# Patient Record
Sex: MALE | Race: WHITE | HISPANIC OR LATINO | Employment: FULL TIME | URBAN - METROPOLITAN AREA
[De-identification: names, ages, dates, MRNs, and addresses within clinical notes are randomized per-mention and may not be internally consistent; named-entity substitution may affect disease eponyms.]

---

## 2018-08-17 ENCOUNTER — HOSPITAL ENCOUNTER (OUTPATIENT)
Facility: HOSPITAL | Age: 33
Discharge: HOME | End: 2018-08-18
Attending: EMERGENCY MEDICINE | Admitting: OTOLARYNGOLOGY

## 2018-08-17 DIAGNOSIS — R09.A2 FOREIGN BODY SENSATION IN THROAT: Primary | ICD-10-CM

## 2018-08-17 PROCEDURE — 99284 EMERGENCY DEPT VISIT MOD MDM: CPT | Mod: ,,, | Performed by: EMERGENCY MEDICINE

## 2018-08-17 PROCEDURE — 99284 EMERGENCY DEPT VISIT MOD MDM: CPT | Mod: 25

## 2018-08-18 VITALS
RESPIRATION RATE: 14 BRPM | HEART RATE: 53 BPM | SYSTOLIC BLOOD PRESSURE: 118 MMHG | BODY MASS INDEX: 29.71 KG/M2 | OXYGEN SATURATION: 99 % | DIASTOLIC BLOOD PRESSURE: 90 MMHG | HEIGHT: 64 IN | TEMPERATURE: 98 F | WEIGHT: 174 LBS

## 2018-08-18 PROBLEM — R09.A2 FOREIGN BODY SENSATION IN THROAT: Status: ACTIVE | Noted: 2018-08-18

## 2018-08-18 PROCEDURE — G0378 HOSPITAL OBSERVATION PER HR: HCPCS

## 2018-08-18 PROCEDURE — 63600175 PHARM REV CODE 636 W HCPCS: Performed by: OTOLARYNGOLOGY

## 2018-08-18 PROCEDURE — 99204 OFFICE O/P NEW MOD 45 MIN: CPT | Mod: 25,,, | Performed by: OTOLARYNGOLOGY

## 2018-08-18 PROCEDURE — 31575 DIAGNOSTIC LARYNGOSCOPY: CPT | Mod: ,,, | Performed by: OTOLARYNGOLOGY

## 2018-08-18 RX ORDER — ACETAMINOPHEN 325 MG/1
650 TABLET ORAL EVERY 8 HOURS PRN
Status: DISCONTINUED | OUTPATIENT
Start: 2018-08-18 | End: 2018-08-18 | Stop reason: HOSPADM

## 2018-08-18 RX ORDER — METHYLPREDNISOLONE 4 MG/1
TABLET ORAL
Qty: 1 PACKAGE | Refills: 0 | Status: SHIPPED | OUTPATIENT
Start: 2018-08-18 | End: 2018-09-08

## 2018-08-18 RX ORDER — IBUPROFEN 400 MG/1
400 TABLET ORAL EVERY 6 HOURS PRN
Status: DISCONTINUED | OUTPATIENT
Start: 2018-08-18 | End: 2018-08-18 | Stop reason: HOSPADM

## 2018-08-18 RX ORDER — ONDANSETRON 8 MG/1
8 TABLET, ORALLY DISINTEGRATING ORAL EVERY 8 HOURS PRN
Status: DISCONTINUED | OUTPATIENT
Start: 2018-08-18 | End: 2018-08-18 | Stop reason: HOSPADM

## 2018-08-18 RX ORDER — DEXAMETHASONE SODIUM PHOSPHATE 4 MG/ML
8 INJECTION, SOLUTION INTRA-ARTICULAR; INTRALESIONAL; INTRAMUSCULAR; INTRAVENOUS; SOFT TISSUE EVERY 6 HOURS
Status: DISCONTINUED | OUTPATIENT
Start: 2018-08-18 | End: 2018-08-18 | Stop reason: HOSPADM

## 2018-08-18 RX ORDER — DEXAMETHASONE SODIUM PHOSPHATE 100 MG/10ML
8 INJECTION INTRAMUSCULAR; INTRAVENOUS EVERY 6 HOURS
Status: DISCONTINUED | OUTPATIENT
Start: 2018-08-18 | End: 2018-08-18 | Stop reason: RX

## 2018-08-18 RX ORDER — DIPHENHYDRAMINE HYDROCHLORIDE 50 MG/ML
25 INJECTION INTRAMUSCULAR; INTRAVENOUS EVERY 4 HOURS PRN
Status: DISCONTINUED | OUTPATIENT
Start: 2018-08-18 | End: 2018-08-18 | Stop reason: HOSPADM

## 2018-08-18 RX ORDER — RAMELTEON 8 MG/1
8 TABLET ORAL NIGHTLY PRN
Status: DISCONTINUED | OUTPATIENT
Start: 2018-08-18 | End: 2018-08-18 | Stop reason: HOSPADM

## 2018-08-18 RX ADMIN — DEXAMETHASONE SODIUM PHOSPHATE 8 MG: 10 INJECTION, SOLUTION INTRAMUSCULAR; INTRAVENOUS at 12:08

## 2018-08-18 NOTE — SUBJECTIVE & OBJECTIVE
Medications:  Continuous Infusions:  Scheduled Meds:   dexamethasone  8 mg Intravenous Q6H     PRN Meds:acetaminophen, diphenhydrAMINE, ibuprofen, ondansetron, promethazine (PHENERGAN) IVPB, ramelteon     No current facility-administered medications on file prior to encounter.      No current outpatient medications on file prior to encounter.       Review of patient's allergies indicates:  No Known Allergies    History reviewed. No pertinent past medical history.  No past surgical history on file.  Family History     None        Tobacco Use    Smoking status: Not on file   Substance and Sexual Activity    Alcohol use: Not on file    Drug use: Not on file    Sexual activity: Not on file     Review of Systems  Objective:     Vital Signs (Most Recent):  Temp: 97.5 °F (36.4 °C) (08/18/18 0416)  Pulse: (!) 52 (08/18/18 0416)  Resp: 18 (08/18/18 0416)  BP: 121/73 (08/18/18 0416)  SpO2: 99 % (08/18/18 0416) Vital Signs (24h Range):  Temp:  [97.5 °F (36.4 °C)-97.7 °F (36.5 °C)] 97.5 °F (36.4 °C)  Pulse:  [52-68] 52  Resp:  [18-20] 18  SpO2:  [99 %] 99 %  BP: (121-135)/(73-82) 121/73     Weight: 78.9 kg (174 lb)  Body mass index is 29.87 kg/m².         Physical Exam    Significant Labs:  {Results:41877}    Significant Diagnostics:  {Imaging Review:73310}

## 2018-08-18 NOTE — SUBJECTIVE & OBJECTIVE
Medications:  Continuous Infusions:  Scheduled Meds:   dexamethasone  8 mg Intravenous Q6H     PRN Meds:acetaminophen, diphenhydrAMINE, ibuprofen, ondansetron, promethazine (PHENERGAN) IVPB, ramelteon     No current facility-administered medications on file prior to encounter.      No current outpatient medications on file prior to encounter.       Review of patient's allergies indicates:  No Known Allergies    History reviewed. No pertinent past medical history.  No past surgical history on file.  Family History     None        Tobacco Use    Smoking status: Not on file   Substance and Sexual Activity    Alcohol use: Not on file    Drug use: Not on file    Sexual activity: Not on file     Review of Systems   Constitutional: Negative for chills, diaphoresis and fever.   HENT: Negative for facial swelling, sinus pressure, trouble swallowing and voice change.    Eyes: Negative for pain and visual disturbance.   Respiratory: Negative for choking, shortness of breath and stridor.      Objective:     Vital Signs (Most Recent):  Temp: 97.7 °F (36.5 °C) (08/17/18 2344)  Pulse: 68 (08/17/18 2344)  Resp: 20 (08/17/18 2344)  BP: 135/82 (08/17/18 2344)  SpO2: 99 % (08/17/18 2344) Vital Signs (24h Range):  Temp:  [97.7 °F (36.5 °C)] 97.7 °F (36.5 °C)  Pulse:  [68] 68  Resp:  [20] 20  SpO2:  [99 %] 99 %  BP: (135)/(82) 135/82     Weight: 78.9 kg (174 lb)  Body mass index is 29.87 kg/m².         Physical Exam  NAD  Awake and alert  Head AT/NC  Auricles WNL AU  Nose w/ normal external appearance  MMM, anterior tongue mobile, FOM soft, OP patent w/ midline uvula, tonsils 2+ bilateral and symmetric  Neck soft, not TTP, normal ROM, no LAD  Normal WOB, no stridor or stertor    Flexible Fiberoptic Laryngoscopy   Nasal cavity/nasopharynx: normal mucosa, inferior turbinates, and septum. No evidence of septal deviation or perforation. There are no visible lesions. Bilateral cesar WNL  Oropharynx: pharyngeal wall without edema,  lesions, or masses. Bilateral pharyngeal tonsils WNL. BOT symmetric without masses or lingual tonsillar hypertrophy.   Hypopharynx: There are no lesions of the piriform sinuses or postcricoid area. There is not pooling of secretions.  Supraglottis: there is no edema of the arytenoids, interarytenoid space, or epiglottis. Epiglottis is crisp. There are no masses noted. False vocal folds without lesions.  Glottis: TVF without lesions and with normal mobility and airway patency  Subglottis: no stenosis or masses          Significant Labs:  None    Significant Diagnostics:  X-Ray: I have reviewed all pertinent results/findings within the past 24 hours and my personal findings are:  plain neck films w/ no abnormalities

## 2018-08-18 NOTE — H&P
[facility]  Otorhinolaryngology-Head & Neck Surgery  H&P Note    Patient Name: Doe Moe  MRN: [id number]  Code Status: Full Code  Admission Date: 8/17/2018  Hospital Length of Stay: 0 days  Attending Physician: Brandon Kitchen MD  Primary Care Provider: Primary Doctor No    Patient information was obtained from patient.     Inpatient consult to ENT  Consult performed by: Angel Sherman MD  Consult ordered by: Livia Ortega MD        Subjective:     Chief Complaint/Reason for Admission: foreign body    History of Present Illness: 34 y/o M no PMH presents to ED after swallowing what he believes to be a fish bone 1 hour ago. He continues to have a globus sensation and pain deep in his throat on the left. It has remained stable and his symptoms have not progressed. He does not have difficulty swallowing but does c/o slight odynophagia. He denies any SOB, stridor, or other symptoms at this time.    Medications:  Continuous Infusions:  Scheduled Meds:   dexamethasone  8 mg Intravenous Q6H     PRN Meds:acetaminophen, diphenhydrAMINE, ibuprofen, ondansetron, promethazine (PHENERGAN) IVPB, ramelteon     No current facility-administered medications on file prior to encounter.      No current outpatient medications on file prior to encounter.       Review of patient's allergies indicates:  No Known Allergies    History reviewed. No pertinent past medical history.  No past surgical history on file.  Family History     None        Tobacco Use    Smoking status: Not on file   Substance and Sexual Activity    Alcohol use: Not on file    Drug use: Not on file    Sexual activity: Not on file     Review of Systems   Constitutional: Negative for chills, diaphoresis and fever.   HENT: Negative for facial swelling, sinus pressure, trouble swallowing and voice change.    Eyes: Negative for pain and visual disturbance.   Respiratory: Negative for choking, shortness of breath and stridor.      Objective:      Vital Signs (Most Recent):  Temp: 97.7 °F (36.5 °C) (08/17/18 2344)  Pulse: 68 (08/17/18 2344)  Resp: 20 (08/17/18 2344)  BP: 135/82 (08/17/18 2344)  SpO2: 99 % (08/17/18 2344) Vital Signs (24h Range):  Temp:  [97.7 °F (36.5 °C)] 97.7 °F (36.5 °C)  Pulse:  [68] 68  Resp:  [20] 20  SpO2:  [99 %] 99 %  BP: (135)/(82) 135/82     Weight: 78.9 kg (174 lb)  Body mass index is 29.87 kg/m².         Physical Exam  NAD  Awake and alert  Head AT/NC  Auricles WNL AU  Nose w/ normal external appearance  MMM, anterior tongue mobile, FOM soft, OP patent w/ midline uvula, tonsils 2+ bilateral and symmetric  Neck soft, not TTP, normal ROM, no LAD  Normal WOB, no stridor or stertor    Flexible Fiberoptic Laryngoscopy   Nasal cavity/nasopharynx: normal mucosa, inferior turbinates, and septum. No evidence of septal deviation or perforation. There are no visible lesions. Bilateral cesar WNL  Oropharynx: pharyngeal wall without edema, lesions, or masses. Bilateral pharyngeal tonsils WNL. BOT symmetric without masses or lingual tonsillar hypertrophy.   Hypopharynx: There are no lesions of the piriform sinuses or postcricoid area. There is not pooling of secretions.  Supraglottis: there is no edema of the arytenoids, interarytenoid space, or epiglottis. Epiglottis is crisp. There are no masses noted. False vocal folds without lesions.  Glottis: TVF without lesions and with normal mobility and airway patency  Subglottis: no stenosis or masses          Significant Labs:  None    Significant Diagnostics:  X-Ray: I have reviewed all pertinent results/findings within the past 24 hours and my personal findings are:  plain neck films w/ no abnormalities    Assessment/Plan:     Foreign body sensation in throat    34 y/o M w/ presumed foreign body in UADT. FFL reveals no abnormalities or foreign body though patient still has symptoms. He is AFVSS and in NAD w/ no concerning airway signs.    - admit for observation  - decadron  - NPO  - plan for  repeat FFL in the morning  - anticipate d/c home later this afternoon          VTE Risk Mitigation (From admission, onward)        Ordered     Place sequential compression device  Until discontinued      08/18/18 0153     IP VTE LOW RISK PATIENT  Once      08/18/18 0153          Thank you for your consult. I will follow-up with patient. Please contact us if you have any additional questions.    Angel Sherman MD  Otorhinolaryngology-Head & Neck Surgery  [facility]

## 2018-08-18 NOTE — ED PROVIDER NOTES
Encounter Date: 8/17/2018       History     Chief Complaint   Patient presents with    Foreign Body In Throat     fishbone in throat for 25 mintute w/ pain and trouble swallowing. 8/10 . pt denies chest pain or sob,     33yoM w/no PMH prsenting with sensation of foreign body in the back of his throat after swallowing a fish bone 25 minutes PTA. Endorsing pain with swallowing but denies difficulty breathing and denies drooling.           Review of patient's allergies indicates:  No Known Allergies  History reviewed. No pertinent past medical history.  No past surgical history on file.  History reviewed. No pertinent family history.  Social History     Tobacco Use    Smoking status: Not on file   Substance Use Topics    Alcohol use: Not on file    Drug use: Not on file     Review of Systems   Constitutional: Negative for chills and fever.   HENT: Positive for sore throat. Negative for ear pain.    Respiratory: Negative for shortness of breath, wheezing and stridor.    Cardiovascular: Negative for chest pain and leg swelling.   Gastrointestinal: Negative for nausea and vomiting.   Genitourinary: Negative for dysuria and hematuria.   Musculoskeletal: Negative for back pain and neck pain.   Skin: Negative for pallor and rash.   Neurological: Negative for weakness.   Hematological: Does not bruise/bleed easily.       Physical Exam     Initial Vitals [08/17/18 2344]   BP Pulse Resp Temp SpO2   135/82 68 20 97.7 °F (36.5 °C) 99 %      MAP       --         Physical Exam    Nursing note and vitals reviewed.  Constitutional: He appears well-developed and well-nourished.   HENT:   Head: Normocephalic and atraumatic.   No foreign body visualized in posterior oropharynx; no uvular swelling or oropharyngeal erythema, no exudates noted; patient managing secretions without difficulty   Eyes: Conjunctivae and EOM are normal. Pupils are equal, round, and reactive to light.   Neck: Normal range of motion. Neck supple.    Cardiovascular: Normal rate, regular rhythm, normal heart sounds and intact distal pulses.   Pulmonary/Chest: Breath sounds normal. No respiratory distress. He has no wheezes. He has no rhonchi. He has no rales. He exhibits no tenderness.   Abdominal: Soft. Bowel sounds are normal. He exhibits no distension. There is no tenderness. There is no rebound and no guarding.   Neurological: He is alert and oriented to person, place, and time.   Skin: Skin is warm and dry. Capillary refill takes less than 2 seconds.   Psychiatric: He has a normal mood and affect.         ED Course   Procedures  Labs Reviewed - No data to display       Imaging Results          X-Ray Neck Soft Tissue (Final result)  Result time 08/18/18 01:10:46    Final result by Solitario Alonso MD (08/18/18 01:10:46)                 Impression:      No retropharyngeal soft tissue thickening.    No enlargement of the epiglottis or thickening of the aryepiglottic folds.      Electronically signed by: Solitario Alonso MD  Date:    08/18/2018  Time:    01:10             Narrative:    EXAMINATION:  XR NECK SOFT TISSUE    CLINICAL HISTORY:  Other specified symptoms and signs involving the circulatory and respiratory systems    TECHNIQUE:  AP and lateral soft tissue views the neck were performed.    COMPARISON:  None.    FINDINGS:  No retropharyngeal soft tissue thickening.  No enlargement of the epiglottis or thickening of the aryepiglottic folds.  Cervical airway is midline and appears patent.  No radiopaque foreign body seen.                                       APC / Resident Notes:   HO4 MDM:  33yoM w/no PMH presenting with foreign body sensation in back of his throat after swallowing a fish bone. Patient is protecting his airway and managing his secretions. Unable to visualize any foreign body on direct visualization. Will get XR soft tissue neck, anticipate ENT consult for scope.  Livia Ortega MD  [facility]  PGY-4  08/18/2018 12:06 AM    HO4 Update:  XR negative for any foreign body. Have consulted ENT for evaluation.  Livia Ortega MD  [facility] PGY-4  08/18/2018 1:17 AM    HO4 update:  ENT scoped patient and did not see any foreign bodies. They will admit the patient for observation overnight.  Livia Ortega MD  [facility]/Internal Medicine PGY-4  08/18/2018 2:00 AM                   Clinical Impression:   The encounter diagnosis was Foreign body sensation in throat.                             Livia Ortega MD  Resident  08/18/18 0205

## 2018-08-18 NOTE — ED TRIAGE NOTES
Pt states that 30min ago he was eating fish with his family when a  fish bone became stuck in his throat. Pt states no trouble breathing but feels the bone in his throat. Pt can speak and is in NAD.

## 2018-08-18 NOTE — ED NOTES
"LOC: Patient name and date of birth verified.  The patient is awake, alert and aware of environment with an appropriate affect, the patient is oriented x 3 and speaking appropriately.  Pt in NAD.    APPEARANCE: Patient resting comfortably and in no acute distress, patient is clean and well groomed, patient's clothing is properly fastened.  SKIN: The skin is warm and dry, color consistent with ethnicity, patient has normal skin turgor and moist mucus membranes, skin intact, no breakdown or brusing noted.  MUSCULOSKELETAL: Patient moving all extremities well, no obvious swelling or deformities noted.  RESPIRATORY: Airway is open and patent, respirations are spontaneous, patient has a normal effort and rate, no accessory muscle use noted. Pt states to have a non-obstructive "fish bone" stuck in his throat   CARDIAC: Patient has a normal rate and rhythm, no periphreal edema noted, capillary refill < 3 seconds.  ABDOMEN: Soft and non tender to palpation, no distention noted. Bowel sounds present in all four quadrants.  NEUROLOGIC: Eyes open spontaneously, behavior appropriate to situation, follows commands, facial expression symmetrical, bilateral hand grasp equal and even, purposeful motor response noted, normal sensation in all extremities when touched with a finger.  "

## 2018-08-18 NOTE — HPI
34 y/o M no PMH presents to ED after swallowing what he believes to be a fish bone 1 hour ago. He continues to have a globus sensation and pain deep in his throat on the left. It has remained stable and his symptoms have not progressed. He does not have difficulty swallowing but does c/o slight odynophagia. He denies any SOB, stridor, or other symptoms at this time.

## 2018-08-18 NOTE — ASSESSMENT & PLAN NOTE
34 y/o M w/ presumed foreign body in UADT. FFL reveals no abnormalities or foreign body though patient still has symptoms. He is AFVSS and in NAD w/ no concerning airway signs.    - admit for observation  - decadron  - NPO  - plan for repeat FFL in the morning  - anticipate d/c home later this afternoon

## 2018-08-18 NOTE — PLAN OF CARE
Problem: Patient Care Overview  Goal: Individualization & Mutuality  Outcome: Ongoing (interventions implemented as appropriate)  Pt is A&Ox4 and was injury free when he arrived from the ED dept.  Wife in room along with young son. Pt denies pain. Breathing is regular equal and unlabored bilaterally on room air.  Heart rate is darshana. The pt does not speak english and the admit assessment was done with GAB

## 2018-08-18 NOTE — CONSULTS
[facility]  Otorhinolaryngology-Head & Neck Surgery  Consult Note    Patient Name: Doe Meo  MRN: [id number]  Code Status: Full Code  Admission Date: 8/17/2018  Hospital Length of Stay: 0 days  Attending Physician: Brandon Kitchen MD  Primary Care Provider: Primary Doctor No    Patient information was obtained from patient.     Inpatient consult to ENT  Consult performed by: Angel Sherman MD  Consult ordered by: Livia Ortega MD        Subjective:     Chief Complaint/Reason for Admission: foreign body    History of Present Illness: 34 y/o M no PMH presents to ED after swallowing what he believes to be a fish bone 1 hour ago. He continues to have a globus sensation and pain deep in his throat on the left. It has remained stable and his symptoms have not progressed. He does not have difficulty swallowing but does c/o slight odynophagia. He denies any SOB, stridor, or other symptoms at this time.    Medications:  Continuous Infusions:  Scheduled Meds:   dexamethasone  8 mg Intravenous Q6H     PRN Meds:acetaminophen, diphenhydrAMINE, ibuprofen, ondansetron, promethazine (PHENERGAN) IVPB, ramelteon     No current facility-administered medications on file prior to encounter.      No current outpatient medications on file prior to encounter.       Review of patient's allergies indicates:  No Known Allergies    History reviewed. No pertinent past medical history.  No past surgical history on file.  Family History     None        Tobacco Use    Smoking status: Not on file   Substance and Sexual Activity    Alcohol use: Not on file    Drug use: Not on file    Sexual activity: Not on file     Review of Systems   Constitutional: Negative for chills, diaphoresis and fever.   HENT: Negative for facial swelling, sinus pressure, trouble swallowing and voice change.    Eyes: Negative for pain and visual disturbance.   Respiratory: Negative for choking, shortness of breath and stridor.       Objective:     Vital Signs (Most Recent):  Temp: 97.7 °F (36.5 °C) (08/17/18 2344)  Pulse: 68 (08/17/18 2344)  Resp: 20 (08/17/18 2344)  BP: 135/82 (08/17/18 2344)  SpO2: 99 % (08/17/18 2344) Vital Signs (24h Range):  Temp:  [97.7 °F (36.5 °C)] 97.7 °F (36.5 °C)  Pulse:  [68] 68  Resp:  [20] 20  SpO2:  [99 %] 99 %  BP: (135)/(82) 135/82     Weight: 78.9 kg (174 lb)  Body mass index is 29.87 kg/m².         Physical Exam  NAD  Awake and alert  Head AT/NC  Auricles WNL AU  Nose w/ normal external appearance  MMM, anterior tongue mobile, FOM soft, OP patent w/ midline uvula, tonsils 2+ bilateral and symmetric  Neck soft, not TTP, normal ROM, no LAD  Normal WOB, no stridor or stertor    Flexible Fiberoptic Laryngoscopy   Nasal cavity/nasopharynx: normal mucosa, inferior turbinates, and septum. No evidence of septal deviation or perforation. There are no visible lesions. Bilateral cesar WNL  Oropharynx: pharyngeal wall without edema, lesions, or masses. Bilateral pharyngeal tonsils WNL. BOT symmetric without masses or lingual tonsillar hypertrophy.   Hypopharynx: There are no lesions of the piriform sinuses or postcricoid area. There is not pooling of secretions.  Supraglottis: there is no edema of the arytenoids, interarytenoid space, or epiglottis. Epiglottis is crisp. There are no masses noted. False vocal folds without lesions.  Glottis: TVF without lesions and with normal mobility and airway patency  Subglottis: no stenosis or masses          Significant Labs:  None    Significant Diagnostics:  X-Ray: I have reviewed all pertinent results/findings within the past 24 hours and my personal findings are:  plain neck films w/ no abnormalities    Assessment/Plan:     Foreign body sensation in throat    34 y/o M w/ presumed foreign body in UADT. FFL reveals no abnormalities or foreign body though patient still has symptoms. He is AFVSS and in NAD w/ no concerning airway signs.    - admit for observation  - decadron  -  NPO  - plan for repeat FFL in the morning  - anticipate d/c home later this afternoon          VTE Risk Mitigation (From admission, onward)        Ordered     Place sequential compression device  Until discontinued      08/18/18 0153     IP VTE LOW RISK PATIENT  Once      08/18/18 0153          Thank you for your consult. I will follow-up with patient. Please contact us if you have any additional questions.    Angel Sherman MD  Otorhinolaryngology-Head & Neck Surgery  [facility]

## 2018-08-18 NOTE — NURSING
Discharge instructions discussed with pt using Rosanne. Verbalizes understanding. Paperwork given to Pt and explained where to  his prescription. Medications reconciled. IV D/C'd, catheter intact. Tolerated well. Vital signs stable. No acute distress noted. Pt refused transportation and walked off the unit.

## 2020-08-24 NOTE — DISCHARGE SUMMARY
[facility]  Short Stay  Discharge Summary    Admit Date: 8/17/2018    Discharge Date and Time: 8/18/2018  1:35 PM      Discharge Attending Physician: Solis Freeman MD     Hospital Course (synopsis of major diagnoses, care, treatment, and services provided during the course of the hospital stay): patient admitted for observation after swallowing a fish bone. FFL showed no abnormalities but c/o globus sensation. He was started on steroids and fluids and did well throughout the day. He had improvement in symptoms and was discharged home in the evening.    Final Diagnoses:    Principal Problem: Foreign body sensation in throat   Secondary Diagnoses:   Active Hospital Problems    Diagnosis  POA    *Foreign body sensation in throat [R09.89]  Yes      Resolved Hospital Problems   No resolved problems to display.       Discharged Condition: good    Disposition: Home or Self Care    Follow up/Patient Instructions:    Medications:  Reconciled Home Medications:      Medication List      START taking these medications    methylPREDNISolone 4 mg tablet  Commonly known as:  MEDROL DOSEPACK  Use según las indicaciones  (use as directed)          Discharge Procedure Orders   Diet Adult Regular     Notify your health care provider if you experience any of the following:   Order Comments: Worsening throat pain or difficulty breathing     Activity as tolerated           
151.8